# Patient Record
Sex: MALE | Race: WHITE | URBAN - METROPOLITAN AREA
[De-identification: names, ages, dates, MRNs, and addresses within clinical notes are randomized per-mention and may not be internally consistent; named-entity substitution may affect disease eponyms.]

---

## 2024-04-25 ENCOUNTER — OFFICE VISIT (OUTPATIENT)
Dept: ORTHOPEDIC SURGERY | Age: 64
End: 2024-04-25
Payer: MEDICARE

## 2024-04-25 VITALS — WEIGHT: 205 LBS | HEIGHT: 71 IN | BODY MASS INDEX: 28.7 KG/M2

## 2024-04-25 DIAGNOSIS — M79.671 RIGHT FOOT PAIN: Primary | ICD-10-CM

## 2024-04-25 DIAGNOSIS — M79.2 FOOT NEURALGIA: ICD-10-CM

## 2024-04-25 DIAGNOSIS — M21.6X1 CAVOVARUS DEFORMITY OF FOOT, ACQUIRED, RIGHT: ICD-10-CM

## 2024-04-25 PROCEDURE — L4360 PNEUMAT WALKING BOOT PRE CST: HCPCS | Performed by: ORTHOPAEDIC SURGERY

## 2024-04-25 PROCEDURE — 99204 OFFICE O/P NEW MOD 45 MIN: CPT | Performed by: ORTHOPAEDIC SURGERY

## 2024-04-25 RX ORDER — GABAPENTIN 100 MG/1
CAPSULE ORAL
Qty: 90 CAPSULE | Refills: 2 | Status: SHIPPED | OUTPATIENT
Start: 2024-04-25 | End: 2024-07-25

## 2024-04-25 NOTE — PROGRESS NOTES
Patient was fitted and instructed on an Even Up for the left foot.  Patient was fitted and instructed on an [employer] Air Heel Brace and [employer] Ankle Sleeve for the right foot.  The patient was prescribed a walker boot for the patient's right foot. The patient wears a size 12 shoe and I fitted them with a L size boot. The patient was fitted and instructed on the use of prescribed walker boot. I explained how to fit themselves and that the plastic flexible piece should always be on the front of the boot and secured by the Velcro straps on top. The air bladder in the boot was adjusted according to proper fit and comfort. The patient walked a short distance and acknowledged satisfaction with current fit. I also explained that they need a heel lift or a higher heeled shoe for the uninvolved LE to help normalize gait and avoid excessive low back stress/strain due to leg length inequality created from walker boot.Patient read and signed documenting they understand and agree to [facility]'s current DME return policy.

## 2024-04-25 NOTE — PROGRESS NOTES
Name: Natasha Jackson  YOB: 1960  Gender: male  MRN: [id number]    CC: Right foot pain    HPI:   04/25/2024: Initial visit: Right foot pain: Over 6-month duration but more consistent and limiting February 2024    ROS/Meds/PSH/PMH/FH/SH: reviewed today    Tobacco:  reports that he has never smoked. He has never used smokeless tobacco.   CABG: Pacemaker    Physical Examination:  Patient appears to be alert and oriented with acceptable appearance.  No obvious distress or SOB  CV: appears to have acceptable vascular color and capillary refill  Neuro: appears to have mostly intact light touch sensation   Skin: Right = forefoot purplish discoloration that he reports is a birthmark; no soft tissue swelling  MS: Standing: Mild cavovarus: Gait protected  Left = limited ankle dorsiflexion to neutral  Left = slight limited hindfoot mobility  Left = 1-5 IDN neuralgia with no click  Left = no MTP pain or instability  Left = 5/5 strength; no crepitance    XR: Right: Standing AP lateral mortise ankle plus AP oblique foot taken today with ankle impinging exostoses; subtalar joint arthritis; fragmented os peroneum at calcaneocuboid; tarsometatarsal arthritis; suspected mild vascular calcifications;  XR Impression:  As above      Reviewed Test/Records/Documents:   03/21/2024: Dr. Luis Manuel Anand: Reports reviewing Ms. Leona Carey NP's note correlating with diagnosis of: Right plantar fasciitis: Injection: Discussed cavovarus  04/11/2024: Dr. Issa: [facility] [place]: Reports reviewing note from Ms. Eloy Carey NP diagnosis: Right foot pain: Resolved plantar fasciitis: Lateral column overload:: Again referenced cavovarus and prescribed custom insoles to offload fifth metatarsal: Discussed donut device:  03/21/2024: 04/11/2024: Right foot x-rays: Report discusses cavovarus foot deformity: No significant degenerative changes: No fracture noted:     Injection: No indication    Assessment: